# Patient Record
Sex: MALE | Race: BLACK OR AFRICAN AMERICAN
[De-identification: names, ages, dates, MRNs, and addresses within clinical notes are randomized per-mention and may not be internally consistent; named-entity substitution may affect disease eponyms.]

---

## 2018-06-03 ENCOUNTER — HOSPITAL ENCOUNTER (EMERGENCY)
Dept: HOSPITAL 76 - ED | Age: 43
Discharge: HOME | End: 2018-06-03
Payer: COMMERCIAL

## 2018-06-03 VITALS — DIASTOLIC BLOOD PRESSURE: 93 MMHG | SYSTOLIC BLOOD PRESSURE: 160 MMHG

## 2018-06-03 DIAGNOSIS — J03.00: Primary | ICD-10-CM

## 2018-06-03 PROCEDURE — 96365 THER/PROPH/DIAG IV INF INIT: CPT

## 2018-06-03 PROCEDURE — 87430 STREP A AG IA: CPT

## 2018-06-03 PROCEDURE — 87070 CULTURE OTHR SPECIMN AEROBIC: CPT

## 2018-06-03 PROCEDURE — 99283 EMERGENCY DEPT VISIT LOW MDM: CPT

## 2018-06-03 PROCEDURE — 96375 TX/PRO/DX INJ NEW DRUG ADDON: CPT

## 2018-06-03 PROCEDURE — 87077 CULTURE AEROBIC IDENTIFY: CPT

## 2018-06-03 NOTE — ED PHYSICIAN DOCUMENTATION
PD HPI URI





- Stated complaint


Stated Complaint: SORE THROAT





- Chief complaint


Chief Complaint: Heent





- History obtained from


History obtained from: Patient





- History of Present Illness


Timing - onset: How many days ago (3)


Timing duration: Days (3)


Timing details: Abrupt onset, Still present


Associated symptoms: Fever, Sore throat, Swollen nodes.  No: Nasal congestion, 

Dry cough, Dyspnea


Contributing factors: No: Sick contact, Travel, Immunocompromised


Similar symptoms before: Has not had sx before


Recently seen: Clinic (seen at [facility] 2 days ago and states he had positive 

strep test and started on Amox and Ibuprofen. He says he is feeling worse 

swelling in throat and glands despite meds. ainful swallowing and feels 

swelling in right side of throat.)





Review of Systems


Constitutional: reports: Fever, Chills, Myalgias


Nose: denies: Rhinorrhea / runny nose, Congestion


Throat: reports: Sore throat


Cardiac: denies: Chest pain / pressure, Palpitations


Respiratory: denies: Dyspnea, Cough


GI: reports: Nausea, Diarrhea (for the past 1-2 days).  denies: Vomiting


Skin: denies: Rash





PD PAST MEDICAL HISTORY





- Past Medical History


Past Medical History: No


Cardiovascular: None


Respiratory: None


Neuro: None


HEENT: None





- Past Surgical History


Past Surgical History: No





- Present Medications


Home Medications: 


 Ambulatory Orders











 Medication  Instructions  Recorded  Confirmed


 


Amox/Clav 875/125 [Augmentin 1 tab PO BID 06/03/18 06/03/18





875/125]   


 


Cephalexin [Keflex] 500 mg PO QID #28 capsule 06/03/18 


 


Dexamethasone [Decadron] 4 mg PO DAILY #5 tablet 06/03/18 


 


HYDROcod/ACETAM 5/325 [Norco 5/325] 1 tab PO Q6H PRN #12 tablet 06/03/18 


 


Ibuprofen [Motrin] 1 tab PO RTTID PRN 06/03/18 06/03/18














- Allergies


Allergies/Adverse Reactions: 


 Allergies











Allergy/AdvReac Type Severity Reaction Status Date / Time


 


No Known Drug Allergies Allergy   Verified 06/03/18 21:58














- Social History


Does the pt smoke?: No


Smoking Status: Never smoker


Does the pt drink ETOH?: No


Does the pt have substance abuse?: No





- Immunizations


Immunizations are current?: Yes





- POLST


Patient has POLST: No





PD ED PE NORMAL





- Vitals


Vital signs reviewed: Yes





- General


General: Alert and oriented X 3, No acute distress, Well developed/nourished





- HEENT


HEENT: No: Pharynx benign (tonsillar swelling and exudate right side mostly. 

There is peritonsillar swelling on right with slight tonsillar deviation to the 

center. No noted bulging/abscess appearance around the tonsil, but there is 

some edema and redness. Left side with mild tonsillar exudate and swelling, but 

no peritonsillar swelling. )





- Neck


Neck: Supple, no meningeal sign, Other (moderate anterior tendern adenopathy, 

more on the right. )





- Cardiac


Cardiac: RRR, No murmur





- Respiratory


Respiratory: Clear bilaterally





- Derm


Derm: Normal color, Warm and dry, No rash





- Neuro


Neuro: Alert and oriented X 3, No motor deficit, Normal speech





Results





- Vitals


Vitals: 


 Vital Signs - 24 hr











  06/03/18





  21:55


 


Temperature 36.2 C L


 


Heart Rate 52 L


 


Respiratory 18





Rate 


 


Blood Pressure 157/99 H


 


O2 Saturation 100








 Oxygen











O2 Source                      Room air

















- Labs


Labs: 


 Laboratory Tests











  06/03/18





  22:00


 


Group A Strep Rapid  Negative














PD MEDICAL DECISION MAKING





- ED course


Complexity details: considered differential (given some peritonsillar findings, 

will give IV dose abx and steroids. I do not see abscess appearance to 

necessitate I&D as yet. ), d/w patient





Departure





- Departure


Disposition: 01 Home, Self Care


Clinical Impression: 


 Peritonsillar cellulitis





Acute tonsillitis


Qualifiers:


 Pharyngitis/tonsillitis etiology: streptococcus Streptococcal tonsillitis 

recurrence: non-recurrent Qualified Code(s): J03.00 - Acute streptococcal 

tonsillitis, unspecified





Condition: Stable


Record reviewed to determine appropriate education?: Yes


Instructions:  ED Peritonsillar Infec Abx No I andD


Follow-Up: 


[facility] [Provider Group]


Prescriptions: 


Cephalexin [Keflex] 500 mg PO QID #28 capsule


Dexamethasone [Decadron] 4 mg PO DAILY #5 tablet


HYDROcod/ACETAM 5/325 [Norco 5/325] 1 tab PO Q6H PRN #12 tablet


 PRN Reason: Pain


Comments: 


Drink lots of fluids.  Stop the amoxicillin and changed to cephalexin as 

directed.  Add Decadron steroid anti-inflammatory for 5 more days.  This 

combination should help the infection and swelling quite a bit over the next 

day or so.  It would still take several days for her to completely improve.  

Continue ibuprofen if needed for pains and add hydrocodone or Tylenol if needed 

for worse pain.  Rest off work if needed for a day or 2.  Recheck if not 

improving over the next 1-2 days.


Forms:  Activity restrictions

## 2018-06-22 ENCOUNTER — HOSPITAL ENCOUNTER (EMERGENCY)
Dept: HOSPITAL 76 - ED | Age: 43
Discharge: HOME | End: 2018-06-22
Payer: COMMERCIAL

## 2018-06-22 VITALS — DIASTOLIC BLOOD PRESSURE: 88 MMHG | SYSTOLIC BLOOD PRESSURE: 138 MMHG

## 2018-06-22 DIAGNOSIS — S82.861A: Primary | ICD-10-CM
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
## 2018-06-22 DIAGNOSIS — S82.51XA: ICD-10-CM

## 2018-06-22 PROCEDURE — 29515 APPLICATION SHORT LEG SPLINT: CPT

## 2018-06-22 PROCEDURE — 99283 EMERGENCY DEPT VISIT LOW MDM: CPT
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
## 2018-06-22 NOTE — XRAY REPORT
Procedure Date:  06/22/2018   

Accession Number:  [id number] / [id number]                    

Procedure:  XR  - Tib/Fib RT CPT Code:  

 

FULL RESULT:

 

 

EXAM:

RIGHT TIBIA/FIBULA RADIOGRAPHY

 

EXAM DATE: 6/22/2018 09:27 PM.

 

CLINICAL HISTORY: Right ankle injury today.

 

COMPARISON: None.

 

TECHNIQUE: 2 views.

 

FINDINGS:

Bones: There is a nondisplaced fracture of the superior fibula diaphysis. 

There is a medial malleolus fracture at the ankle.

 

Joints: No dislocation at knee or ankle.

 

Soft Tissues: There is medial soft tissue swelling at ankle.

IMPRESSION:

1. Nondisplaced superior fibula diaphysis fracture.

2. Medial malleolus fracture with medial ankle soft tissue swelling.

 

[facility]

## 2018-06-29 ENCOUNTER — HOSPITAL ENCOUNTER (EMERGENCY)
Dept: HOSPITAL 76 - ED | Age: 43
Discharge: HOME | End: 2018-06-29
Payer: COMMERCIAL

## 2018-06-29 VITALS — SYSTOLIC BLOOD PRESSURE: 148 MMHG | DIASTOLIC BLOOD PRESSURE: 87 MMHG

## 2018-06-29 DIAGNOSIS — S82.891A: ICD-10-CM

## 2018-06-29 DIAGNOSIS — M25.571: Primary | ICD-10-CM

## 2018-06-29 PROCEDURE — 99283 EMERGENCY DEPT VISIT LOW MDM: CPT

## 2018-06-29 PROCEDURE — 29505 APPLICATION LONG LEG SPLINT: CPT

## 2018-06-29 PROCEDURE — 99282 EMERGENCY DEPT VISIT SF MDM: CPT

## 2018-06-29 NOTE — ED PHYSICIAN DOCUMENTATION
PD HPI LOWER EXT INJURY





- Stated complaint


Stated Complaint: RT ANKLE PX POST OP





- Chief complaint


Chief Complaint: Ext Problem





- History obtained from


History obtained from: Patient





- History of Present Illness


PD HPI LOW EXT INJURY LOCATION: Other (Recent right ankle fracture and had ORIF 

yesterday.  The splint is too tight and he is having a lot of pain.)





Review of Systems


Constitutional: reports: Reviewed and negative


Cardiac: reports: Reviewed and negative


Respiratory: reports: Reviewed and negative





PD PAST MEDICAL HISTORY





- Past Medical History


Cardiovascular: None


Respiratory: None


Neuro: None


HEENT: None





- Past Surgical History


Past Surgical History: No





- Present Medications


Home Medications: 


 Ambulatory Orders











 Medication  Instructions  Recorded  Confirmed


 


Acetaminophen [Tylenol] 325 mg PO 06/29/18 


 


Aspirin 325 mg PO 06/29/18 


 


Docusate Calcium 240 mg PO 06/29/18 


 


Morphine Ir [Ms Ir] 15 mg PO Q6H PRN #15 tablet 06/29/18 


 


Ondansetron Odt [Zofran Odt]  06/29/18 06/29/18


 


oxyCODONE [Roxicodone] 5 mg PO Q4-6H 06/29/18 06/29/18














- Allergies


Allergies/Adverse Reactions: 


 Allergies











Allergy/AdvReac Type Severity Reaction Status Date / Time


 


No Known Drug Allergies Allergy   Verified 06/22/18 21:09














- Social History


Does the pt smoke?: No


Smoking Status: Never smoker


Does the pt drink ETOH?: No


Does the pt have substance abuse?: No





- Immunizations


Immunizations are current?: Yes





- POLST


Patient has POLST: No





PD ED PE NORMAL





- Vitals


Vital signs reviewed: Yes





- General


General: Alert and oriented X 3, No acute distress





- Extremities


Extremities: Other (He has a plaster splint in place, it was removed with 

resolution of his symptoms and replaced with another splint.)





- Neuro


Neuro: Alert and oriented X 3





Results





- Vitals


Vitals: 


 Vital Signs - 24 hr











  06/29/18





  11:23


 


Temperature 36.6 C


 


Heart Rate 97


 


Respiratory 18





Rate 


 


Blood Pressure 148/87 H


 


O2 Saturation 100








 Oxygen











O2 Source                      Room air

















Procedures





- Splint (location)


  ** RLE


Splint applied by: Physician


Type of splint: Fiberglass, Long leg, Posterior, Stirrup


Other: Patient tolerated well, No complications, Neurovascular intact





PD MEDICAL DECISION MAKING





- ED course


ED course: 





The splint was replaced with a fiberglass one, I spoke with Dr. Shaffer by 

phone who would like to examine him himself and will see him Monday morning.





- Sepsis Event


Vital Signs: 


 Vital Signs - 24 hr











  06/29/18





  11:23


 


Temperature 36.6 C


 


Heart Rate 97


 


Respiratory 18





Rate 


 


Blood Pressure 148/87 H


 


O2 Saturation 100








 Oxygen











O2 Source                      Room air

















Departure





- Departure


Disposition: 01 Home, Self Care


Clinical Impression: 


 Tight cast





Condition: Good


Record reviewed to determine appropriate education?: Yes


Follow-Up: 


[facility] MD [Provider Admit Priv/Credential] - 


Prescriptions: 


Morphine Ir [Ms Ir] 15 mg PO Q6H PRN #15 tablet


 PRN Reason: Pain


Comments: 


Go to [facility] 0730 on Monday report to [facility] to see Dr Shaffer

## 2018-08-17 ENCOUNTER — HOSPITAL ENCOUNTER (EMERGENCY)
Dept: HOSPITAL 76 - ED | Age: 43
Discharge: HOME | End: 2018-08-17
Payer: COMMERCIAL

## 2018-08-17 VITALS — DIASTOLIC BLOOD PRESSURE: 92 MMHG | SYSTOLIC BLOOD PRESSURE: 137 MMHG

## 2018-08-17 DIAGNOSIS — M75.92: Primary | ICD-10-CM

## 2018-08-17 PROCEDURE — 99283 EMERGENCY DEPT VISIT LOW MDM: CPT

## 2018-08-17 PROCEDURE — 20552 NJX 1/MLT TRIGGER POINT 1/2: CPT

## 2018-08-17 PROCEDURE — 73030 X-RAY EXAM OF SHOULDER: CPT

## 2018-08-17 NOTE — XRAY REPORT
Procedure Date:  08/17/2018   

Accession Number:  [id number] / [id number]                    

Procedure:  XR  - Shoulder 3 View LT CPT Code:  

 

FULL RESULT:

 

 

EXAM:

left SHOULDER RADIOGRAPHY

 

EXAM DATE: 8/17/2018 02:10 PM.

 

CLINICAL HISTORY: Left shoulder pain for 2 weeks. No known trauma.

 

COMPARISON: None.

 

TECHNIQUE: 3 views.

 

FINDINGS:

Bones: Normal. No fracture or bone lesion.

 

Joints: 6 mm elevation epicenter left clavicle relative to the epicenter 

type II acromion.

No bony degenerative changes at the AC joint nor at the glenohumeral 

compartment. Normal caliber subacromial space.

 

Soft tissues: The visualized hemithorax is unremarkable. Mild skin 

contour abnormality overlying the AC joint. No soft tissue calcification 

nor swelling.

IMPRESSION: Mild separation left AC joint of indeterminate age. Correlate 

clinically.

 

[facility]

## 2018-08-17 NOTE — ED PHYSICIAN DOCUMENTATION
PD HPI UPPER EXT INJURY





- Stated complaint


Stated Complaint: LT SHOULDER PX





- Chief complaint


Chief Complaint: Ext Problem





- History obtained from


History obtained from: Patient





- History of Present Illness


Location: Left, Shoulder (lateral aspect shoulder hurting with ROM for the past 

week or so.)


Type of injury: No: Fall (he fell and hurt ankle end of June and with surgery. 

Had been using 2 crutches until about 2 weeks ago, when cast off and now 

walking boot right ankle; using 1 crutch with right arm now. Left shoulder has 

started hurting without overuse nor noted injury.), Twist


Timing - onset: How many weeks ago (1)


Timing - details: Gradual onset, Still present (more consistent and increased 

level of pain.), Waxing and waning


Worsened by: Moving (abduction and rotational, as well as lifting. He says it 

aches when still and he needs to move it around to help the pain at times.), 

Palpating


Associated symptoms: Weakness, Numbness


Similar symptoms before: Has not had sx before





Review of Systems


Constitutional: denies: Fever, Chills, Myalgias


Nose: denies: Rhinorrhea / runny nose, Congestion


Throat: denies: Sore throat


Respiratory: denies: Cough


GI: denies: Vomiting, Diarrhea


Skin: denies: Rash, Lesions


Neurologic: denies: Focal weakness, Numbness (he says some tingling feeling at 

times dorsal aspect of forearm.)





PD PAST MEDICAL HISTORY





- Past Medical History


Past Medical History: No


Cardiovascular: None


Respiratory: None


Neuro: None


HEENT: None





- Past Surgical History


Past Surgical History: Yes


Ortho: Other





- Present Medications


Home Medications: 


 Ambulatory Orders











 Medication  Instructions  Recorded  Confirmed


 


Acetaminophen [Tylenol] 325 mg PO 06/29/18 


 


Aspirin 325 mg PO 06/29/18 


 


Docusate Calcium 240 mg PO 06/29/18 


 


Morphine Ir [Ms Ir] 15 mg PO Q6H PRN #15 tablet 06/29/18 


 


Ondansetron Odt [Zofran Odt]  06/29/18 06/29/18


 


oxyCODONE [Roxicodone] 5 mg PO Q4-6H 06/29/18 06/29/18


 


Methocarbamol [Robaxin] 500 mg PO TID PRN #20 tablet 08/17/18 


 


Tramadol HCl 50 mg PO Q6H PRN #20 tablet 08/17/18 














- Allergies


Allergies/Adverse Reactions: 


 Allergies











Allergy/AdvReac Type Severity Reaction Status Date / Time


 


No Known Drug Allergies Allergy   Verified 06/22/18 21:09














- Social History


Does the pt smoke?: No


Smoking Status: Never smoker


Does the pt drink ETOH?: No


Does the pt have substance abuse?: No





- Immunizations


Immunizations are current?: Yes





- POLST


Patient has POLST: No





PD ED PE NORMAL





- Vitals


Vital signs reviewed: Yes





- General


General: Alert and oriented X 3, No acute distress, Well developed/nourished





- Neck


Neck: Supple, no meningeal sign, No bony TTP, No adenopathy, Other (some mild 

tender suprascapular area but no rash nor sores. )





- Cardiac


Cardiac: RRR, No murmur





- Respiratory


Respiratory: Clear bilaterally





- Derm


Derm: Normal color, Warm and dry, No rash, Other (left shoulder tender at 

lateral aspect and at AC area. No noted deformity. )





- Extremities


Extremities: No deformity, Normal ROM s pain





- Neuro


Neuro: Alert and oriented X 3, No motor deficit, No sensory deficit, Normal 

speech





Results





- Vitals


Vitals: 


 Oxygen











O2 Source                      Room air

















- Rads (name of study)


  ** left shoulder


Radiology: Prelim report reviewed (normal)





PD MEDICAL DECISION MAKING





- ED course


Complexity details: considered differential (seems like tendonitis of lateral 

shoulder - likely some of the rotator cuff though could be deltoid/etc. There 

is slight tenderness at AC area but normal xray. Local injection in muscle 

focal tender area with marcaine and kenalog (trigger point injection - not joint

/tendon). ), d/w patient





- Sepsis Event


Vital Signs: 


 Oxygen











O2 Source                      Room air

















Departure





- Departure


Disposition: 01 Home, Self Care


Clinical Impression: 


 Left shoulder tendonitis





Condition: Stable


Record reviewed to determine appropriate education?: Yes


Instructions:  Rotator Cuff Injury


Follow-Up: 


[facility] [place] [Provider Group]


Prescriptions: 


Methocarbamol [Robaxin] 500 mg PO TID PRN #20 tablet


 PRN Reason: Spasms


Tramadol HCl 50 mg PO Q6H PRN #20 tablet


 PRN Reason: Pain


Comments: 


Minimal lifting and heavy use of the left shoulder for a week.  Continue the 

ibuprofen you have been taking 3 times a day.  Add Tylenol and/or tramadol if 

needed for pains particularly at night for sleep.  He can add methocarbamol if 

needed for spasms or stiffness in the shoulder and upper arm.  I think this is 

some inflammation of the shoulder muscles (rotator cuff tendinitis).  I think 

this will improve with the above treatments and with the injection that was 

done here.  Follow-up if not improved over the next week.


Forms:  Activity restrictions


Discharge Date/Time: 08/17/18 14:26

## 2019-02-01 ENCOUNTER — HOSPITAL ENCOUNTER (OUTPATIENT)
Dept: HOSPITAL 76 - SDS | Age: 44
Discharge: HOME | End: 2019-02-01
Attending: ORTHOPAEDIC SURGERY
Payer: COMMERCIAL

## 2019-02-01 VITALS — SYSTOLIC BLOOD PRESSURE: 126 MMHG | DIASTOLIC BLOOD PRESSURE: 79 MMHG

## 2019-02-01 DIAGNOSIS — F17.290: ICD-10-CM

## 2019-02-01 DIAGNOSIS — T84.84XA: Primary | ICD-10-CM

## 2019-02-01 PROCEDURE — 0SPF04Z REMOVAL OF INTERNAL FIXATION DEVICE FROM RIGHT ANKLE JOINT, OPEN APPROACH: ICD-10-PCS | Performed by: ORTHOPAEDIC SURGERY

## 2019-02-01 PROCEDURE — 20670 REMOVAL IMPLANT SUPERFICIAL: CPT

## 2019-02-01 NOTE — OPERATIVE REPORT
Operative Report





- General


Procedure Date: 02/01/19


Planned Procedure: Right ankle implant removal


Pre-Op Diagnosis: Right ankle symptomatic implant


Procedure Performed: 





Right ankle implant removal


Post Op Diagnosis: same





- Procedure Note


Primary Surgeon: Rajeev Shaffer


Estimated Blood Loss (mL): 1


Complications: 





None





- Other


Other Information/Narrative: 





Postoperative Protocol: Weightbearing as tolerated in cam walker boot for 2 

weeks.  If the incision heals nicely I anticipate releasing him to normal 

activities after that.





Indication For Surgery: 43-year-old male who underwent ORIF for a bony 

maissoneuve fracture 7 months ago with a lateral plate utilizing a syndesmosis 

screw and tight rope and a single screw medially.  He is return to most 

activities but continues to have a sensation that he describes the ankle does 

not feel right when he attempts to do dynamic activities.  I discussed with him 

that it may be secondary to his syndesmosis screw fixation that is creating 

abnormal motion at the articulation between the distal tibia and distal fibula. 

He additionally had some tenderness and pain medially at the location of his 

medial screw to help improve the sensations and pain I offered him a implant 

removal procedure.  The risks, benefits, and alternatives were discussed.  Risks

include pain, bleeding, infection, damage to nearby structures, numbness, lack 

of symptom relief, implant breakage, implant complications, nonunion, need for 

further surgery, DVT, PE, stroke, and death.  Written consent was obtained.





Procedure in Detail: The patient was met in the pre-operative hold area on the 

day of the procedure.  The operative extremity was signed and questions were 

answered.  The patient was brought to the operating room and a general 

anesthetic was administered.  Supine position was used and bony prominences were

padded.  Standard prepping and draping was performed.  A time out confirmed 

patient identification, laterality, procedure, allergies, antibiotics, and 

images.  An Esmarch was used to exsanguinate the limb and the tourniquet was 

elevated to 250 mmHg.  Total tourniquet time was 32 minutes.  





The screws were localized with fluoroscopy and portions of the old incisions 

were used.  I made a 2 cm incision medially and dissected with a blunt snap 

spreading the tissues in line.  I identified the screw head and cleared it with 

a curette.  The screwdriver was then placed into it and it was removed without 

issue.  Fluoroscopy confirmed complete removal.  I then moved laterally and 

localized the screw with fluoroscopy a 1 cm incision was made overlying the 

screw and blunt dissection was carried down to it.  A curette was used to remove

soft tissue from the screw head and the implant was removed in its entirety.  It

was noted that the syndesmosis screw was quite loose confirming what we had seen

on x-ray.





I then performed an external rotation stress test of the ankle and the mortise 

was stable and intact.  I then took final images.  The wounds were irrigated 

copiously with saline and the skin was closed with nylon.  10 cc of quarter 

percent Marcaine plain were placed about the wounds and a sterile dressing was 

applied.  He was awakened and transferred to recovery room

## 2021-07-19 ENCOUNTER — HOSPITAL ENCOUNTER (EMERGENCY)
Dept: HOSPITAL 76 - ED | Age: 46
Discharge: HOME | End: 2021-07-19
Payer: COMMERCIAL

## 2021-07-19 VITALS — SYSTOLIC BLOOD PRESSURE: 138 MMHG | DIASTOLIC BLOOD PRESSURE: 94 MMHG

## 2021-07-19 DIAGNOSIS — M25.562: ICD-10-CM

## 2021-07-19 DIAGNOSIS — M25.462: ICD-10-CM

## 2021-07-19 DIAGNOSIS — M17.12: Primary | ICD-10-CM

## 2021-07-19 PROCEDURE — 99283 EMERGENCY DEPT VISIT LOW MDM: CPT

## 2021-07-19 PROCEDURE — 73562 X-RAY EXAM OF KNEE 3: CPT

## 2021-07-19 RX ADMIN — DEXAMETHASONE SODIUM PHOSPHATE STA MG: 10 INJECTION, SOLUTION INTRAMUSCULAR; INTRAVENOUS at 09:53

## 2021-07-19 RX ADMIN — COMPOUNDING SYRUP VEHICLE ONE ML: 1 SYRUP at 09:53

## 2021-07-19 RX ADMIN — ACETAMINOPHEN STA MG: 325 TABLET ORAL at 09:53

## 2021-07-19 NOTE — XRAY REPORT
PROCEDURE:  Knee 3 View LT

 

INDICATIONS:  knee pain and swelling worse

 

TECHNIQUE:  3 views of the left knee(s) were acquired.  

 

COMPARISON:  None.

 

FINDINGS:  

 

Bones:  No fractures or dislocations.  Mild tricompartmental osteoarthritis is seen more prominent in
 medial femoral tibial compartment. No suspicious bony lesions.  

 

Soft tissues: Moderate to large suprapatellar joint effusion is seen. No suspicious soft tissue calci
fications.  

 

IMPRESSION:  Moderate to large joint effusion. No gross acute fracture or dislocation. Mild tricompar
tmental osteoarthritis.

 

Reviewed by: Alfred Matta MD on 7/19/2021 10:06 AM PDT

Approved by: Alfred Matta MD on 7/19/2021 10:06 AM PDT

 

 

Station ID:  [id number]

## 2021-07-19 NOTE — ED PHYSICIAN DOCUMENTATION
PD HPI LOWER EXT INJURY





- Stated complaint


Stated Complaint: LEFT KNEE PAIN





- Chief complaint


Chief Complaint: Ext Problem





- History obtained from


History obtained from: Patient





- History of Present Illness


PD HPI LOW EXT INJURY LOCATION: Left, Knee


Type of injury: No: Fall, Twist


Where injury occurred: Other (no new injury per se. Has had pains left knee for 

months to years intermittently. Having more pain the past several days with 

swelling. Having hard/painful ROM.)


Timing - duration: Days


Timing - details: Gradual onset, Still present


Worsened by: Moving (flexion and extension. Particularly painful for going up 

and down steps.)


Associated symptoms: Swelling.  No: Weakness, Numbness


Similar symptoms before: Diagnosis (arthritis of the knee, and presumed meniscal

problem.)


Recently seen: Not recently seen





Review of Systems


Constitutional: denies: Fever, Chills


Skin: denies: Rash, Lesions


Musculoskeletal: reports: Joint pain (left knee)


Neurologic: denies: Focal weakness, Numbness





PD PAST MEDICAL HISTORY





- Past Medical History


Cardiovascular: None


Respiratory: None


Neuro: None


Endocrine/Autoimmune: None


GI: None


: None


HEENT: None


Psych: None


Musculoskeletal: Other


Derm: None





- Past Surgical History


Past Surgical History: Yes


Ortho: Other





- Present Medications


Home Medications: 


                                Ambulatory Orders











 Medication  Instructions  Recorded  Confirmed


 


Ibuprofen [Motrin] 800 mg PO Q8H PRN 01/29/19 07/19/21


 


HYDROcod/ACETAM 5/325 [Norco 5/325] 1 ea PO Q6H PRN #15 tablet 07/19/21 


 


dexAMETHasone [Decadron] 4 mg PO DAILY #5 tablet 07/19/21 














- Allergies


Allergies/Adverse Reactions: 


                                    Allergies











Allergy/AdvReac Type Severity Reaction Status Date / Time


 


No Known Drug Allergies Allergy   Verified 07/19/21 09:21














- Social History


Does the pt smoke?: No


Smoking Status: Never smoker


Does the pt drink ETOH?: No


Does the pt have substance abuse?: No





- Immunizations


Immunizations are current?: Yes





- POLST


Patient has POLST: No





PD ED PE NORMAL





- Vitals


Vital signs reviewed: Yes





- General


General: Alert and oriented X 3, No acute distress, Well developed/nourished





- Derm


Derm: Normal color, Warm and dry





- Extremities


Extremities: Other (left knee with mild to moderate effusion. No pain nor laxity

 with ligament testing. Pain with ROM and rotational impaction, c/w meniscal 

problem. )





- Neuro


Neuro: Alert and oriented X 3, No motor deficit, No sensory deficit, Normal 

speech





Results





- Vitals


Vitals: 


                                     Oxygen











O2 Source                      Room air

















- Rads (name of study)


  ** left knee


Radiology: Prelim report reviewed (effusion; mild tricompartment osteoarthritis.

 ), See rad report





PD MEDICAL DECISION MAKING





- ED course


Complexity details: reviewed results (xray with mild degenerative changes.), 

considered differential (ligaments feel sturdy without pain. Pain with ROM and 

weight bearing with some effusion. Likely meniscal. ), d/w patient





Departure





- Departure


Disposition: 01 Home, Self Care


Clinical Impression: 


Knee pain


Qualifiers:


 Chronicity: acute Laterality: left Qualified Code(s): M25.562 - Pain in left 

knee





Knee effusion


Qualifiers:


 Laterality: left Qualified Code(s): M25.462 - Effusion, left knee





Condition: Stable


Record reviewed to determine appropriate education?: Yes


Instructions:  ED Meniscal Injury Knee Poss


Follow-Up: 


Alfredo Torre MD [Provider Admit Priv/Credential] - 


[facility] [Provider Group]


Prescriptions: 


dexAMETHasone [Decadron] 4 mg PO DAILY #5 tablet


HYDROcod/ACETAM 5/325 [Norco 5/325] 1 ea PO Q6H PRN #15 tablet


 PRN Reason: Pain


Comments: 


Continue Ibuprofen 400 mg 3 times daily. Add decadron steroir daily for 5 days 

to help as well. Add Tylenol or hydrocodone as needed for pains. Knee brace when

up and around for 2-3 weeks. Light acitivty until follow up. FOllow up with PCP 

or Ortho in the next week. 


Forms:  Activity restrictions


Discharge Date/Time: 07/19/21 10:42

## 2021-11-08 ENCOUNTER — HOSPITAL ENCOUNTER (EMERGENCY)
Dept: HOSPITAL 76 - ED | Age: 46
Discharge: HOME | End: 2021-11-08
Payer: COMMERCIAL

## 2021-11-08 VITALS — SYSTOLIC BLOOD PRESSURE: 166 MMHG | DIASTOLIC BLOOD PRESSURE: 103 MMHG

## 2021-11-08 DIAGNOSIS — J02.8: Primary | ICD-10-CM

## 2021-11-08 PROCEDURE — 99282 EMERGENCY DEPT VISIT SF MDM: CPT

## 2021-11-08 PROCEDURE — 87070 CULTURE OTHR SPECIMN AEROBIC: CPT

## 2021-11-08 PROCEDURE — 99283 EMERGENCY DEPT VISIT LOW MDM: CPT

## 2021-11-08 PROCEDURE — 87430 STREP A AG IA: CPT

## 2021-11-08 NOTE — ED PHYSICIAN DOCUMENTATION
PD HPI HEENT





- Stated complaint


Stated Complaint: SORE THROAT





- Chief complaint


Chief Complaint: Heent





- History obtained from


History obtained from: Patient





- Additional information


Additional information: 


Pt comes to the ED c/o sore throat and congestion.  No fever.  No abd pain.  No 

known sick contacts, although pt has had strep pharyngitis before.  No other 

complaints at this time.








Review of Systems


Ten Systems: 10 systems reviewed and negative


Constitutional: reports: Reviewed and negative


Eyes: reports: Reviewed and negative


Ears: reports: Reviewed and negative


Nose: reports: Reviewed and negative


Throat: reports: Sore throat


Cardiac: reports: Reviewed and negative


Respiratory: reports: Reviewed and negative


GI: reports: Reviewed and negative


: reports: Reviewed and negative


Skin: reports: Reviewed and negative


Musculoskeletal: reports: Reviewed and negative


Neurologic: reports: Reviewed and negative


Psychiatric: reports: Reviewed and negative


Endocrine: reports: Reviewed and negative


Immunocompromised: reports: Reviewed and negative





PD PAST MEDICAL HISTORY





- Past Medical History


Cardiovascular: None


Respiratory: None


Neuro: None


Endocrine/Autoimmune: None


GI: None


: None


HEENT: None


Psych: None


Musculoskeletal: Other


Derm: None





- Past Surgical History


Past Surgical History: Yes


Ortho: Other





- Present Medications


Home Medications: 


                                Ambulatory Orders











 Medication  Instructions  Recorded  Confirmed


 


Ibuprofen [Motrin] 800 mg PO Q8H PRN 01/29/19 07/19/21


 


HYDROcod/ACETAM 5/325 [Norco 5/325] 1 ea PO Q6H PRN #15 tablet 07/19/21 


 


dexAMETHasone [Decadron] 4 mg PO DAILY #5 tablet 07/19/21 














- Allergies


Allergies/Adverse Reactions: 


                                    Allergies











Allergy/AdvReac Type Severity Reaction Status Date / Time


 


No Known Drug Allergies Allergy   Verified 11/08/21 12:02














- Social History


Does the pt smoke?: No


Smoking Status: Never smoker


Does the pt drink ETOH?: No


Does the pt have substance abuse?: No





- Immunizations


Immunizations are current?: Yes





- POLST


Patient has POLST: No





PD ED PE NORMAL





- Vitals


Vital signs reviewed: Yes





- General


General: Alert and oriented X 3, No acute distress





- HEENT


HEENT: Atraumatic, PERRL, EOMI, Moist mucous membranes, Pharynx benign





- Neck


Neck: Supple, no meningeal sign





- Cardiac


Cardiac: RRR, No murmur





- Respiratory


Respiratory: Clear bilaterally





- Abdomen


Abdomen: Normal bowel sounds, Soft, Non tender, Non distended





- Derm


Derm: Warm and dry





- Extremities


Extremities: No deformity





- Neuro


Neuro: Alert and oriented X 3





- Psych


Psych: Normal mood, Normal affect





Results





- Vitals


Vitals: 


                                     Oxygen











O2 Source                      Room air

















- Labs


Labs: 


                                  Microbiology











 11/08/21 12:04 Group A Strep Throat Culture - Preliminary





 Throat    CULTURE IN PROGRESS. RESULTS TO FOLLOW.








                                Laboratory Tests











  11/08/21





  12:04


 


Group A Strep Rapid  Negative














PD MEDICAL DECISION MAKING





- ED course


Complexity details: reviewed results, re-evaluated patient, considered 

differential, d/w patient


ED course: 





Pt was handling his secretions well.  Strep test negative.  We have discussed 

home management of the sx and the usual indications for return.





Departure





- Departure


Disposition: 01 Home, Self Care


Clinical Impression: 


 Acute viral pharyngitis





Condition: Stable


Instructions:  ED Pharyngitis Viral


Comments: 


Your rapid strep test is negative today.  Most likely, given that you have 

congestion and a cough, your sore throat is from a viral infection.  This is not

 treatable with antibiotics and your body will have to fight it off on its own. 

 However, a culture will be done from the throat swab we did today, and if it 

comes back positive for strep, we will contact you and call in antibiotics.  You

 may treat your symptoms with over-the-counter medication and other comforting 

things such as tea and cough drops.  Please follow-up with your primary care 

physician as needed.


Discharge Date/Time: 11/08/21 13:24

## 2024-02-27 ENCOUNTER — HOSPITAL ENCOUNTER (OUTPATIENT)
Dept: HOSPITAL 76 - DI.WOS | Age: 49
Discharge: HOME | End: 2024-02-27
Attending: ORTHOPAEDIC SURGERY
Payer: COMMERCIAL

## 2024-02-27 DIAGNOSIS — M17.11: Primary | ICD-10-CM

## 2024-02-27 NOTE — XRAY REPORT
Melanie is a 50 PROCEDURE:  Knee View RT

 

INDICATIONS:  RIGHT KNEE PAIN

 

TECHNIQUE:  3 views of the knee(s) were acquired.  

 

COMPARISON:  None.

 

FINDINGS:  

 

Bones:  No fractures or dislocations. There is moderate femorotibial compartment narrowing and small 
tricompartmental osteophytes. No suspicious bony lesions.   

 

Soft tissues:  No knee joint effusion. No suspicious soft tissue calcifications or masses.  

 

 

IMPRESSION:  

No acute bony abnormality.

 

Degenerative change.

 

Reviewed by: Isabel Bishop MD on 2/27/2024 5:51 PM PST

Approved by: Isabel Bishop MD on 2/27/2024 5:51 PM PST

 

 

Station ID:  [id number] Amilcar Walters MD;  Location: [facility]      SECTION      X2     CONIZATION LEEP       GYN SURGERY      HYSTERECTOMY     GYN SURGERY Right     OOPHORECTOMY      HERNIORRHAPHY INGUINAL Left 2017    Procedure: HERNIORRHAPHY INGUINAL;;  Surgeon: Timbo Luis MD;  Location: [facility]     LABIALPLASTY Right      LAPAROSCOPIC ABLATION ENDOMETRIOSIS N/A 2017    Procedure: LAPAROSCOPIC ABLATION ENDOMETRIOSIS;;  Surgeon: Rashida Lutz MD;  Location: [facility]     LAPAROSCOPY DIAGNOSTIC (GYN) N/A 2017    Procedure: LAPAROSCOPY DIAGNOSTIC (GYN);  DIAGNOSTIC LAPAROSCOPY, FULGURATION OF ENDOMETRIOSIS (ASHWINI/NAHUM) OPEN LEFT INGUINAL HERNIA REPAIR WITH MESH (DEREK) ;  Surgeon: Rashida Lutz MD;  Location: Cape Cod STAPLES;  Surgeon: Amilcar Walters MD;  Location: [facility]     LASER  "CO2 LAPAROSCOPY DIAGNOSTIC, LYSIS ADHESIONS, COMBINED N/A 5/21/2015    Procedure: COMBINED LASER CO2 LAPAROSCOPY DIAGNOSTIC, LYSIS ADHESIONS;  Surgeon: Amilcar Walters MD;  Location: Fitchburg General Rashida Lutz MD